# Patient Record
Sex: FEMALE | Race: WHITE | NOT HISPANIC OR LATINO
[De-identification: names, ages, dates, MRNs, and addresses within clinical notes are randomized per-mention and may not be internally consistent; named-entity substitution may affect disease eponyms.]

---

## 2017-11-08 ENCOUNTER — APPOINTMENT (OUTPATIENT)
Dept: NEUROLOGY | Facility: CLINIC | Age: 22
End: 2017-11-08
Payer: COMMERCIAL

## 2017-11-08 VITALS
BODY MASS INDEX: 23.66 KG/M2 | HEIGHT: 65 IN | OXYGEN SATURATION: 99 % | HEART RATE: 70 BPM | WEIGHT: 142 LBS | DIASTOLIC BLOOD PRESSURE: 80 MMHG | SYSTOLIC BLOOD PRESSURE: 115 MMHG

## 2017-11-08 DIAGNOSIS — Z78.9 OTHER SPECIFIED HEALTH STATUS: ICD-10-CM

## 2017-11-08 DIAGNOSIS — Z80.8 FAMILY HISTORY OF MALIGNANT NEOPLASM OF OTHER ORGANS OR SYSTEMS: ICD-10-CM

## 2017-11-08 DIAGNOSIS — Z87.09 PERSONAL HISTORY OF OTHER DISEASES OF THE RESPIRATORY SYSTEM: ICD-10-CM

## 2017-11-08 DIAGNOSIS — Z86.79 PERSONAL HISTORY OF OTHER DISEASES OF THE CIRCULATORY SYSTEM: ICD-10-CM

## 2017-11-08 DIAGNOSIS — Z82.0 FAMILY HISTORY OF EPILEPSY AND OTHER DISEASES OF THE NERVOUS SYSTEM: ICD-10-CM

## 2017-11-08 DIAGNOSIS — Z87.19 PERSONAL HISTORY OF OTHER DISEASES OF THE DIGESTIVE SYSTEM: ICD-10-CM

## 2017-11-08 DIAGNOSIS — Z83.3 FAMILY HISTORY OF DIABETES MELLITUS: ICD-10-CM

## 2017-11-08 DIAGNOSIS — Z82.69 FAMILY HISTORY OF OTHER DISEASES OF THE MUSCULOSKELETAL SYSTEM AND CONNECTIVE TISSUE: ICD-10-CM

## 2017-11-08 DIAGNOSIS — Z83.518 FAMILY HISTORY OF OTHER SPECIFIED EYE DISORDER: ICD-10-CM

## 2017-11-08 PROCEDURE — 99204 OFFICE O/P NEW MOD 45 MIN: CPT

## 2017-11-11 RX ORDER — ERGOCALCIFEROL (VITAMIN D2) 1250 MCG
50000 CAPSULE ORAL
Refills: 0 | Status: ACTIVE | COMMUNITY

## 2017-11-11 RX ORDER — RIBOFLAVIN (VITAMIN B2) 50 MG
TABLET ORAL
Refills: 0 | Status: ACTIVE | COMMUNITY

## 2017-11-11 RX ORDER — GLUCOSAMINE/CHONDR SU A SOD 750-600 MG
TABLET ORAL
Refills: 0 | Status: ACTIVE | COMMUNITY

## 2017-11-11 RX ORDER — ASCORBIC ACID 500 MG
500 TABLET ORAL DAILY
Refills: 0 | Status: ACTIVE | COMMUNITY

## 2017-11-13 ENCOUNTER — APPOINTMENT (OUTPATIENT)
Dept: NEUROLOGY | Facility: CLINIC | Age: 22
End: 2017-11-13
Payer: COMMERCIAL

## 2017-11-13 VITALS
WEIGHT: 142 LBS | OXYGEN SATURATION: 100 % | BODY MASS INDEX: 23.66 KG/M2 | HEIGHT: 65 IN | SYSTOLIC BLOOD PRESSURE: 101 MMHG | DIASTOLIC BLOOD PRESSURE: 65 MMHG | HEART RATE: 68 BPM

## 2017-11-13 PROBLEM — Z87.19 HISTORY OF IRRITABLE BOWEL SYNDROME: Status: RESOLVED | Noted: 2017-11-08 | Resolved: 2017-11-13

## 2017-11-13 PROBLEM — Z87.09 HISTORY OF ASTHMA: Status: RESOLVED | Noted: 2017-11-08 | Resolved: 2017-11-13

## 2017-11-13 PROBLEM — Z86.79 HISTORY OF CARDIAC MURMUR: Status: RESOLVED | Noted: 2017-11-08 | Resolved: 2017-11-13

## 2017-11-13 PROCEDURE — 99215 OFFICE O/P EST HI 40 MIN: CPT

## 2017-11-13 RX ORDER — LIDOCAINE 5% 700 MG/1
5 PATCH TOPICAL
Qty: 5 | Refills: 0 | Status: ACTIVE | COMMUNITY
Start: 2017-11-13 | End: 1900-01-01

## 2017-11-13 RX ORDER — RIBOFLAVIN (VITAMIN B2) 100 MG
TABLET ORAL
Refills: 0 | Status: DISCONTINUED | COMMUNITY
End: 2017-11-13

## 2017-11-13 RX ORDER — LEVOFLOXACIN 250 MG/1
250 TABLET, FILM COATED ORAL
Qty: 3 | Refills: 0 | Status: DISCONTINUED | COMMUNITY
Start: 2017-06-17 | End: 2017-11-13

## 2017-11-22 ENCOUNTER — APPOINTMENT (OUTPATIENT)
Dept: NEUROLOGY | Facility: CLINIC | Age: 22
End: 2017-11-22
Payer: COMMERCIAL

## 2017-11-22 VITALS
HEIGHT: 65 IN | HEART RATE: 88 BPM | SYSTOLIC BLOOD PRESSURE: 119 MMHG | DIASTOLIC BLOOD PRESSURE: 80 MMHG | BODY MASS INDEX: 23.66 KG/M2 | WEIGHT: 142 LBS | OXYGEN SATURATION: 98 %

## 2017-11-22 PROCEDURE — 99212 OFFICE O/P EST SF 10 MIN: CPT

## 2017-11-22 RX ORDER — MULTIVITAMIN WITH IRON
50 TABLET ORAL DAILY
Qty: 30 | Refills: 5 | Status: ACTIVE | COMMUNITY
Start: 2017-11-22 | End: 1900-01-01

## 2017-11-27 LAB
24R-OH-CALCIDIOL SERPL-MCNC: 32.7 PG/ML
25(OH)D3 SERPL-MCNC: 48 NG/ML
ACE BLD-CCNC: 34 U/L
ANA PAT FLD IF-IMP: ABNORMAL
ANA SER IF-ACNC: ABNORMAL
FOLATE SERPL-MCNC: 18.6 NG/ML
RHEUMATOID FACT SER QL: <7 IU/ML
RPR SER-TITR: NORMAL
VIT B12 SERPL-MCNC: 387 PG/ML
VIT B2 SERPL-MCNC: 235 UG/L

## 2017-12-12 ENCOUNTER — TRANSCRIPTION ENCOUNTER (OUTPATIENT)
Age: 22
End: 2017-12-12

## 2017-12-13 ENCOUNTER — APPOINTMENT (OUTPATIENT)
Dept: NEUROLOGY | Facility: CLINIC | Age: 22
End: 2017-12-13

## 2017-12-21 ENCOUNTER — APPOINTMENT (OUTPATIENT)
Dept: RHEUMATOLOGY | Facility: CLINIC | Age: 22
End: 2017-12-21
Payer: COMMERCIAL

## 2017-12-21 ENCOUNTER — LABORATORY RESULT (OUTPATIENT)
Age: 22
End: 2017-12-21

## 2017-12-21 VITALS
TEMPERATURE: 98.5 F | HEART RATE: 64 BPM | SYSTOLIC BLOOD PRESSURE: 130 MMHG | HEIGHT: 65 IN | OXYGEN SATURATION: 98 % | BODY MASS INDEX: 23.82 KG/M2 | DIASTOLIC BLOOD PRESSURE: 84 MMHG | WEIGHT: 143 LBS

## 2017-12-21 DIAGNOSIS — D84.9 IMMUNODEFICIENCY, UNSPECIFIED: ICD-10-CM

## 2017-12-21 PROCEDURE — 99205 OFFICE O/P NEW HI 60 MIN: CPT | Mod: 25

## 2017-12-21 PROCEDURE — 36415 COLL VENOUS BLD VENIPUNCTURE: CPT

## 2017-12-27 LAB
ANA SER IF-ACNC: NEGATIVE
ANCA AB SER-IMP: NEGATIVE
B2 GLYCOPROT1 IGA SERPL IA-ACNC: <5 SAU
B2 GLYCOPROT1 IGG SER-ACNC: <5 SGU
B2 GLYCOPROT1 IGM SER-ACNC: <5 SMU
C-ANCA SER-ACNC: NEGATIVE
C1INH SERPL-MCNC: 23 MG/DL
C3 SERPL-MCNC: 98 MG/DL
C4 SERPL-MCNC: 17 MG/DL
CARDIOLIPIN IGM SER-MCNC: 10.1 MPL
CARDIOLIPIN IGM SER-MCNC: 7.6 GPL
CH50 SERPL-MCNC: 52 U/ML
CMV IGG SERPL QL: <0.2 U/ML
CMV IGG SERPL-IMP: NEGATIVE
CMV IGM SERPL QL: <8 AU/ML
CMV IGM SERPL QL: NEGATIVE
CONFIRM: 26.2 SEC
DEPRECATED CARDIOLIPIN IGA SER: <5 APL
DRVVT IMM 1:2 NP PPP: NORMAL
DRVVT SCREEN TO CONFIRM RATIO: 0.85 RATIO
DSDNA AB SER-ACNC: <12 IU/ML
EBV DNA SERPL NAA+PROBE-ACNC: NOT DETECTED IU/ML
EBVPCR LOG: NOT DETECTED LOGIU/ML
ENA RNP AB SER IA-ACNC: 0.4 AL
ENA SCL70 IGG SER IA-ACNC: <0.2 AL
ENA SM AB SER IA-ACNC: <0.2 AL
ENA SS-A AB SER IA-ACNC: <0.2 AL
ENA SS-B AB SER IA-ACNC: <0.2 AL
IGA SER QL IEP: 173 MG/DL
IGG SER QL IEP: 1050 MG/DL
IGM SER QL IEP: 118 MG/DL
P-ANCA TITR SER IF: NEGATIVE
RPR SER-TITR: NORMAL
SCREEN DRVVT: 25.8 SEC
SILICA CLOTTING TIME INTERPRETATION: NORMAL
SILICA CLOTTING TIME S/C: 1.04 RATIO
SMOOTH MUSCLE AB SER QL IF: NORMAL
THYROGLOB AB SERPL-ACNC: <20 IU/ML
THYROPEROXIDASE AB SERPL IA-ACNC: <10 IU/ML
TSH SERPL-ACNC: 2.78 UIU/ML

## 2018-01-10 ENCOUNTER — OTHER (OUTPATIENT)
Age: 23
End: 2018-01-10

## 2018-01-11 ENCOUNTER — LABORATORY RESULT (OUTPATIENT)
Age: 23
End: 2018-01-11

## 2018-01-11 ENCOUNTER — APPOINTMENT (OUTPATIENT)
Dept: NEUROLOGY | Facility: CLINIC | Age: 23
End: 2018-01-11
Payer: COMMERCIAL

## 2018-01-11 VITALS
BODY MASS INDEX: 23.82 KG/M2 | WEIGHT: 143 LBS | DIASTOLIC BLOOD PRESSURE: 82 MMHG | HEIGHT: 65 IN | HEART RATE: 66 BPM | OXYGEN SATURATION: 100 % | SYSTOLIC BLOOD PRESSURE: 119 MMHG

## 2018-01-11 DIAGNOSIS — Z00.00 ENCOUNTER FOR GENERAL ADULT MEDICAL EXAMINATION W/OUT ABNORMAL FINDINGS: ICD-10-CM

## 2018-01-11 LAB
VIT B1 SERPL-MCNC: 183.1 NMOL/L
VIT B6 SERPL-MCNC: 60 UG/L

## 2018-01-11 PROCEDURE — 99215 OFFICE O/P EST HI 40 MIN: CPT

## 2018-01-11 RX ORDER — GABAPENTIN 300 MG/1
300 CAPSULE ORAL AT BEDTIME
Qty: 60 | Refills: 5 | Status: DISCONTINUED | COMMUNITY
Start: 2017-12-04 | End: 2018-01-11

## 2018-01-11 RX ORDER — GABAPENTIN 100 MG/1
100 CAPSULE ORAL DAILY
Qty: 60 | Refills: 5 | Status: DISCONTINUED | COMMUNITY
Start: 2017-11-11 | End: 2018-01-11

## 2018-01-16 PROBLEM — Z00.00 ENCOUNTER FOR PREVENTIVE HEALTH EXAMINATION: Status: ACTIVE | Noted: 2017-11-08

## 2018-01-23 ENCOUNTER — OUTPATIENT (OUTPATIENT)
Dept: OUTPATIENT SERVICES | Facility: HOSPITAL | Age: 23
LOS: 1 days | End: 2018-01-23
Payer: MEDICAID

## 2018-01-23 PROCEDURE — 78320: CPT | Mod: 26

## 2018-01-23 PROCEDURE — A9503: CPT

## 2018-01-23 PROCEDURE — 78803 RP LOCLZJ TUM SPECT 1 AREA: CPT

## 2018-01-25 ENCOUNTER — LABORATORY RESULT (OUTPATIENT)
Age: 23
End: 2018-01-25

## 2018-01-25 ENCOUNTER — APPOINTMENT (OUTPATIENT)
Dept: RHEUMATOLOGY | Facility: CLINIC | Age: 23
End: 2018-01-25
Payer: COMMERCIAL

## 2018-01-25 VITALS
HEIGHT: 65 IN | SYSTOLIC BLOOD PRESSURE: 120 MMHG | OXYGEN SATURATION: 99 % | WEIGHT: 146 LBS | DIASTOLIC BLOOD PRESSURE: 72 MMHG | HEART RATE: 72 BPM | BODY MASS INDEX: 24.32 KG/M2

## 2018-01-25 DIAGNOSIS — A77.0 SPOTTED FEVER DUE TO RICKETTSIA RICKETTSII: ICD-10-CM

## 2018-01-25 PROCEDURE — 99214 OFFICE O/P EST MOD 30 MIN: CPT | Mod: 25

## 2018-01-25 PROCEDURE — 36415 COLL VENOUS BLD VENIPUNCTURE: CPT

## 2018-01-26 LAB — MPO AB + PR3 PNL SER: NORMAL

## 2018-01-30 LAB
IGG4 SER-MCNC: 26.1 MG/DL
MYELOPEROXIDASE AB SER QL IA: <5 UNITS
MYELOPEROXIDASE CELLS FLD QL: NEGATIVE
PROTEINASE3 AB SER IA-ACNC: <5 UNITS
PROTEINASE3 AB SER-ACNC: NEGATIVE
R RICKETTSI IGG CSF-ACNC: POSITIVE
R RICKETTSI IGM CSF-ACNC: 1.29 INDEX

## 2018-02-01 ENCOUNTER — APPOINTMENT (OUTPATIENT)
Dept: INFECTIOUS DISEASE | Facility: CLINIC | Age: 23
End: 2018-02-01
Payer: COMMERCIAL

## 2018-02-01 VITALS
DIASTOLIC BLOOD PRESSURE: 83 MMHG | RESPIRATION RATE: 14 BRPM | SYSTOLIC BLOOD PRESSURE: 126 MMHG | HEART RATE: 72 BPM | HEIGHT: 65 IN | TEMPERATURE: 98.1 F | BODY MASS INDEX: 24.32 KG/M2 | WEIGHT: 146 LBS | OXYGEN SATURATION: 100 %

## 2018-02-01 DIAGNOSIS — Z86.19 PERSONAL HISTORY OF OTHER INFECTIOUS AND PARASITIC DISEASES: ICD-10-CM

## 2018-02-01 DIAGNOSIS — Z92.29 PERSONAL HISTORY OF OTHER DRUG THERAPY: ICD-10-CM

## 2018-02-01 PROCEDURE — 99204 OFFICE O/P NEW MOD 45 MIN: CPT

## 2018-02-23 ENCOUNTER — RX RENEWAL (OUTPATIENT)
Age: 23
End: 2018-02-23

## 2018-02-26 ENCOUNTER — OUTPATIENT (OUTPATIENT)
Dept: OUTPATIENT SERVICES | Facility: HOSPITAL | Age: 23
LOS: 1 days | Discharge: HOME | End: 2018-02-26

## 2018-02-26 DIAGNOSIS — H57.13 OCULAR PAIN, BILATERAL: ICD-10-CM

## 2018-02-28 LAB
24R-OH-CALCIDIOL SERPL-MCNC: 38.7 PG/ML
25(OH)D3 SERPL-MCNC: 53.9 NG/ML
A-TUMOR NECROSIS FACT SERPL-MCNC: 1 PG/ML
A-TUMOR NECROSIS FACT SERPL-MCNC: <5 PG/ML
ACE BLD-CCNC: 36 U/L
ALBUMIN MFR SERPL ELPH: 61.3 %
ALBUMIN SERPL ELPH-MCNC: 4.5 G/DL
ALBUMIN SERPL-MCNC: 4.4 G/DL
ALBUMIN/GLOB SERPL: 1.6 RATIO
ALP BLD-CCNC: 34 U/L
ALPHA1 GLOB MFR SERPL ELPH: 3.9 %
ALPHA1 GLOB SERPL ELPH-MCNC: 0.3 G/DL
ALPHA2 GLOB MFR SERPL ELPH: 9.4 %
ALPHA2 GLOB SERPL ELPH-MCNC: 0.7 G/DL
ALT SERPL-CCNC: 31 U/L
ANAPLASMA PHAGOCYTO IGM COMMENT: NORMAL
ANAPLASMA PHAGOCYTOPHILIA IGG ANTIBODIES: NORMAL
ANAPLASMA PHAGOCYTOPHILIA IGM ANTIBODIES: NORMAL
ANCA AB SER-IMP: NEGATIVE
ANION GAP SERPL CALC-SCNC: 13 MMOL/L
ARSENIC, BLOOD: 3 UG/L
AST SERPL-CCNC: 23 U/L
B BURGDOR AB SER-IMP: NEGATIVE
B BURGDOR IGM PATRN SER IB-IMP: NEGATIVE
B BURGDOR18/20KD IGM SER QL IB: NORMAL
B BURGDOR18KD IGG SER QL IB: NORMAL
B BURGDOR23KD IGG SER QL IB: NORMAL
B BURGDOR23KD IGM SER QL IB: NORMAL
B BURGDOR28KD AB SER QL IB: NORMAL
B BURGDOR28KD IGG SER QL IB: PRESENT
B BURGDOR30KD AB SER QL IB: NORMAL
B BURGDOR30KD IGG SER QL IB: NORMAL
B BURGDOR31KD IGG SER QL IB: NORMAL
B BURGDOR31KD IGM SER QL IB: NORMAL
B BURGDOR39KD IGG SER QL IB: PRESENT
B BURGDOR39KD IGM SER QL IB: NORMAL
B BURGDOR41KD IGG SER QL IB: NORMAL
B BURGDOR41KD IGM SER QL IB: NORMAL
B BURGDOR45KD AB SER QL IB: NORMAL
B BURGDOR45KD IGG SER QL IB: NORMAL
B BURGDOR58KD AB SER QL IB: NORMAL
B BURGDOR58KD IGG SER QL IB: NORMAL
B BURGDOR66KD IGG SER QL IB: PRESENT
B BURGDOR66KD IGM SER QL IB: NORMAL
B BURGDOR93KD IGG SER QL IB: NORMAL
B BURGDOR93KD IGM SER QL IB: NORMAL
B-GLOBULIN MFR SERPL ELPH: 10.8 %
B-GLOBULIN SERPL ELPH-MCNC: 0.8 G/DL
BASOPHILS # BLD AUTO: 0.05 K/UL
BASOPHILS NFR BLD AUTO: 0.7 %
BILIRUB SERPL-MCNC: 0.5 MG/DL
BUN SERPL-MCNC: 10 MG/DL
C-ANCA SER-ACNC: NEGATIVE
C3 SERPL-MCNC: 97 MG/DL
C4 SERPL-MCNC: 17 MG/DL
CADMIUM SERPL-MCNC: NORMAL UG/L
CALCIUM SERPL-MCNC: 10.1 MG/DL
CHLORIDE SERPL-SCNC: 100 MMOL/L
CO2 SERPL-SCNC: 26 MMOL/L
CONFIRM: 30 SEC
CREAT SERPL-MCNC: 0.99 MG/DL
CRP SERPL HS-MCNC: 0.9 MG/L
DEPRECATED KAPPA LC FREE/LAMBDA SER: 1.27 RATIO
DRVVT IMM 1:2 NP PPP: NORMAL
DRVVT SCREEN TO CONFIRM RATIO: 0.89 RATIO
DSDNA AB SER-ACNC: <12 IU/ML
EBV EA AB SER IA-ACNC: 35.6 U/ML
EBV EA AB TITR SER IF: POSITIVE
EBV EA IGG SER QL IA: 209 U/ML
EBV EA IGG SER-ACNC: POSITIVE
EBV EA IGM SER IA-ACNC: NEGATIVE
EBV PATRN SPEC IB-IMP: NORMAL
EBV VCA IGG SER IA-ACNC: 320 U/ML
EBV VCA IGM SER QL IA: <10 U/ML
EHRLICIA CHAFFEENIS IGG ANTIBODIES: NORMAL
EHRLICIA CHAFFEENIS IGG COMMENT: NORMAL
EHRLICIA CHAFFEENIS IGG INTERP: NORMAL
EHRLICIA CHAFFEENIS IGM ANTIBODIES: NORMAL
EOSINOPHIL # BLD AUTO: 0.22 K/UL
EOSINOPHIL NFR BLD AUTO: 3.1 %
EPSTEIN-BARR VIRUS CAPSID ANTIGEN IGG: POSITIVE
ERYTHROCYTE [SEDIMENTATION RATE] IN BLOOD BY WESTERGREN METHOD: 5 MM/HR
FOLATE SERPL-MCNC: >20 NG/ML
GAD65 AB SER-MCNC: 0 NMOL/L
GAMMA GLOB FLD ELPH-MCNC: 1.1 G/DL
GAMMA GLOB MFR SERPL ELPH: 14.6 %
GLUCOSE SERPL-MCNC: 86 MG/DL
GQ1B AB IGG: NORMAL TITER
HCT VFR BLD CALC: 39 %
HGB BLD-MCNC: 12.4 G/DL
HOMOCYSTEINE LEVEL: 7.1 UMOL/L
HSV 1+2 IGG SER IA-IMP: NEGATIVE
HSV 1+2 IGG SER IA-IMP: NEGATIVE
HSV1 IGG SER QL: 0.16 INDEX
HSV1 IGM SER QL: NORMAL TITER
HSV2 AB FLD-ACNC: NORMAL TITER
HSV2 IGG SER QL: 0.1 INDEX
IGA 24H UR QL IFE: NORMAL
IGA SER QL IEP: 174 MG/DL
IGG SER QL IEP: 991 MG/DL
IGM SER QL IEP: 109 MG/DL
IL10 SERPL-MCNC: <5 PG/ML
IL12 SERPL-MCNC: <5 PG/ML
IL13 SERPL-MCNC: 5 PG/ML
IL2 SERPL-MCNC: 5 PG/ML
IL2 SERPL-MCNC: 568 PG/ML
IL4 SERPL-MCNC: <5 PG/ML
IL6 SERPL-MCNC: <5 PG/ML
IL8 SERPL-MCNC: <5 PG/ML
IMM GRANULOCYTES NFR BLD AUTO: 0.1 %
INTERFERON GAMMA: <5 PG/ML
INTERLEUKIN 1 BETA: <5 PG/ML
INTERLEUKIN 17: <5 PG/ML
INTERLEUKIN 5: <5 PG/ML
INTERPRETATION SERPL IEP-IMP: NORMAL
KAPPA LC CSF-MCNC: 1.07 MG/DL
KAPPA LC SERPL-MCNC: 1.36 MG/DL
LEAD BLD-MCNC: NORMAL UG/DL
LYMPHOCYTES # BLD AUTO: 2.99 K/UL
LYMPHOCYTES NFR BLD AUTO: 41.7 %
M PROTEIN SPEC IFE-MCNC: NORMAL
MAN DIFF?: NORMAL
MCHC RBC-ENTMCNC: 29.5 PG
MCHC RBC-ENTMCNC: 31.8 GM/DL
MCV RBC AUTO: 92.6 FL
MERCURY BLD-MCNC: NORMAL UG/L
METHYLMALONIC ACID LEVEL: 160 NMOL/L
MONOCYTES # BLD AUTO: 0.47 K/UL
MONOCYTES NFR BLD AUTO: 6.6 %
NEUTROPHILS # BLD AUTO: 3.43 K/UL
NEUTROPHILS NFR BLD AUTO: 47.8 %
P-ANCA TITR SER IF: NEGATIVE
PLATELET # BLD AUTO: 303 K/UL
POTASSIUM SERPL-SCNC: 4.4 MMOL/L
PROT SERPL-MCNC: 7.2 G/DL
R RICKETTSI IGG CSF-ACNC: NEGATIVE
R RICKETTSI IGM CSF-ACNC: 1.7 INDEX
RBC # BLD: 4.21 M/UL
RBC # FLD: 13.9 %
SCREEN DRVVT: 29.9 SEC
SODIUM SERPL-SCNC: 139 MMOL/L
VIT B12 SERPL-MCNC: 664 PG/ML
VIT B6 SERPL-MCNC: 89.7 UG/L
WBC # FLD AUTO: 7.17 K/UL

## 2018-03-15 DIAGNOSIS — E22.9 HYPERFUNCTION OF PITUITARY GLAND, UNSPECIFIED: ICD-10-CM

## 2018-03-28 ENCOUNTER — LABORATORY RESULT (OUTPATIENT)
Age: 23
End: 2018-03-28

## 2018-03-28 ENCOUNTER — APPOINTMENT (OUTPATIENT)
Dept: NEUROLOGY | Facility: CLINIC | Age: 23
End: 2018-03-28
Payer: COMMERCIAL

## 2018-03-28 VITALS
BODY MASS INDEX: 24.32 KG/M2 | HEART RATE: 63 BPM | OXYGEN SATURATION: 99 % | DIASTOLIC BLOOD PRESSURE: 80 MMHG | WEIGHT: 146 LBS | HEIGHT: 65 IN | SYSTOLIC BLOOD PRESSURE: 100 MMHG

## 2018-03-28 PROCEDURE — 99215 OFFICE O/P EST HI 40 MIN: CPT

## 2018-04-11 ENCOUNTER — APPOINTMENT (OUTPATIENT)
Dept: NEUROLOGY | Facility: CLINIC | Age: 23
End: 2018-04-11
Payer: COMMERCIAL

## 2018-04-11 VITALS
BODY MASS INDEX: 24.32 KG/M2 | SYSTOLIC BLOOD PRESSURE: 115 MMHG | WEIGHT: 146 LBS | OXYGEN SATURATION: 99 % | DIASTOLIC BLOOD PRESSURE: 70 MMHG | HEART RATE: 66 BPM | HEIGHT: 65 IN

## 2018-04-11 PROCEDURE — 99214 OFFICE O/P EST MOD 30 MIN: CPT

## 2018-04-11 RX ORDER — NORTRIPTYLINE HYDROCHLORIDE 10 MG/1
10 CAPSULE ORAL
Qty: 60 | Refills: 3 | Status: DISCONTINUED | COMMUNITY
Start: 2018-01-11 | End: 2018-04-11

## 2018-04-11 RX ORDER — DULOXETINE HYDROCHLORIDE 30 MG/1
30 CAPSULE, DELAYED RELEASE PELLETS ORAL TWICE DAILY
Qty: 60 | Refills: 5 | Status: DISCONTINUED | COMMUNITY
Start: 2018-01-10 | End: 2018-04-11

## 2018-04-11 RX ORDER — DULOXETINE HYDROCHLORIDE 60 MG/1
60 CAPSULE, DELAYED RELEASE PELLETS ORAL
Qty: 30 | Refills: 5 | Status: DISCONTINUED | COMMUNITY
End: 2018-04-11

## 2018-04-11 RX ORDER — DIAZEPAM 10 MG/1
10 TABLET ORAL
Qty: 60 | Refills: 0 | Status: DISCONTINUED | COMMUNITY
Start: 2018-02-23 | End: 2018-04-11

## 2018-05-23 ENCOUNTER — APPOINTMENT (OUTPATIENT)
Dept: NEUROLOGY | Facility: CLINIC | Age: 23
End: 2018-05-23
Payer: COMMERCIAL

## 2018-05-23 VITALS
SYSTOLIC BLOOD PRESSURE: 110 MMHG | OXYGEN SATURATION: 99 % | HEIGHT: 65 IN | DIASTOLIC BLOOD PRESSURE: 80 MMHG | HEART RATE: 54 BPM

## 2018-05-23 PROCEDURE — 99213 OFFICE O/P EST LOW 20 MIN: CPT

## 2018-05-23 RX ORDER — OMEGA-3/DHA/EPA/FISH OIL 1200 MG
1200 CAPSULE ORAL
Refills: 0 | Status: ACTIVE | COMMUNITY

## 2018-05-23 RX ORDER — GABAPENTIN 300 MG/1
300 CAPSULE ORAL TWICE DAILY
Qty: 180 | Refills: 5 | Status: DISCONTINUED | COMMUNITY
Start: 2018-02-12 | End: 2018-05-23

## 2018-06-13 ENCOUNTER — APPOINTMENT (OUTPATIENT)
Dept: NEUROLOGY | Facility: CLINIC | Age: 23
End: 2018-06-13
Payer: COMMERCIAL

## 2018-06-13 ENCOUNTER — OUTPATIENT (OUTPATIENT)
Dept: OUTPATIENT SERVICES | Facility: HOSPITAL | Age: 23
LOS: 1 days | Discharge: HOME | End: 2018-06-13

## 2018-06-13 VITALS
WEIGHT: 145 LBS | BODY MASS INDEX: 24.16 KG/M2 | DIASTOLIC BLOOD PRESSURE: 70 MMHG | SYSTOLIC BLOOD PRESSURE: 100 MMHG | OXYGEN SATURATION: 99 % | HEART RATE: 60 BPM | HEIGHT: 65 IN

## 2018-06-13 DIAGNOSIS — G44.52 NEW DAILY PERSISTENT HEADACHE (NDPH): ICD-10-CM

## 2018-06-13 PROCEDURE — 99213 OFFICE O/P EST LOW 20 MIN: CPT

## 2018-06-13 RX ORDER — GABAPENTIN 100 MG/1
100 CAPSULE ORAL
Qty: 10 | Refills: 0 | Status: DISCONTINUED | COMMUNITY
Start: 2018-05-23 | End: 2018-06-13

## 2018-06-26 ENCOUNTER — OUTPATIENT (OUTPATIENT)
Dept: OUTPATIENT SERVICES | Facility: HOSPITAL | Age: 23
LOS: 1 days | Discharge: HOME | End: 2018-06-26

## 2018-06-26 DIAGNOSIS — H57.13 OCULAR PAIN, BILATERAL: ICD-10-CM

## 2018-06-26 DIAGNOSIS — M54.81 OCCIPITAL NEURALGIA: ICD-10-CM

## 2018-07-17 ENCOUNTER — OUTPATIENT (OUTPATIENT)
Dept: OUTPATIENT SERVICES | Facility: HOSPITAL | Age: 23
LOS: 1 days | Discharge: HOME | End: 2018-07-17

## 2018-07-17 DIAGNOSIS — M54.81 OCCIPITAL NEURALGIA: ICD-10-CM

## 2018-07-23 PROBLEM — Z80.8 FAMILY HISTORY OF SKIN CANCER: Status: ACTIVE | Noted: 2017-11-08

## 2018-07-25 ENCOUNTER — OUTPATIENT (OUTPATIENT)
Dept: OUTPATIENT SERVICES | Facility: HOSPITAL | Age: 23
LOS: 1 days | Discharge: HOME | End: 2018-07-25

## 2018-07-26 DIAGNOSIS — H57.13 OCULAR PAIN, BILATERAL: ICD-10-CM

## 2018-08-09 ENCOUNTER — RX RENEWAL (OUTPATIENT)
Age: 23
End: 2018-08-09

## 2018-08-14 ENCOUNTER — OUTPATIENT (OUTPATIENT)
Dept: OUTPATIENT SERVICES | Facility: HOSPITAL | Age: 23
LOS: 1 days | Discharge: HOME | End: 2018-08-14

## 2018-08-14 DIAGNOSIS — E06.3 AUTOIMMUNE THYROIDITIS: ICD-10-CM

## 2018-08-23 ENCOUNTER — APPOINTMENT (OUTPATIENT)
Dept: NEUROLOGY | Facility: CLINIC | Age: 23
End: 2018-08-23
Payer: COMMERCIAL

## 2018-08-23 VITALS
BODY MASS INDEX: 24.16 KG/M2 | SYSTOLIC BLOOD PRESSURE: 107 MMHG | DIASTOLIC BLOOD PRESSURE: 73 MMHG | WEIGHT: 145 LBS | HEART RATE: 63 BPM | OXYGEN SATURATION: 99 % | HEIGHT: 65 IN

## 2018-08-23 DIAGNOSIS — G44.039 EPISODIC PAROXYSMAL HEMICRANIA, NOT INTRACTABLE: ICD-10-CM

## 2018-08-23 PROCEDURE — 99213 OFFICE O/P EST LOW 20 MIN: CPT

## 2018-08-23 RX ORDER — NAPROXEN 500 MG/1
500 TABLET ORAL DAILY
Qty: 30 | Refills: 0 | Status: COMPLETED | COMMUNITY
Start: 2018-05-23 | End: 2018-08-23

## 2018-10-31 ENCOUNTER — APPOINTMENT (OUTPATIENT)
Dept: NEUROLOGY | Facility: CLINIC | Age: 23
End: 2018-10-31

## 2018-11-28 ENCOUNTER — APPOINTMENT (OUTPATIENT)
Dept: NEUROLOGY | Facility: CLINIC | Age: 23
End: 2018-11-28
Payer: MEDICAID

## 2018-11-28 VITALS
OXYGEN SATURATION: 99 % | HEIGHT: 65 IN | BODY MASS INDEX: 23.82 KG/M2 | WEIGHT: 143 LBS | HEART RATE: 77 BPM | SYSTOLIC BLOOD PRESSURE: 103 MMHG | DIASTOLIC BLOOD PRESSURE: 70 MMHG

## 2018-11-28 DIAGNOSIS — M54.81 OCCIPITAL NEURALGIA: ICD-10-CM

## 2018-11-28 DIAGNOSIS — G50.0 TRIGEMINAL NEURALGIA: ICD-10-CM

## 2018-11-28 PROCEDURE — 99214 OFFICE O/P EST MOD 30 MIN: CPT

## 2018-11-28 RX ORDER — AMOXICILLIN 875 MG/1
TABLET, FILM COATED ORAL
Refills: 0 | Status: COMPLETED | COMMUNITY

## 2018-12-26 ENCOUNTER — APPOINTMENT (OUTPATIENT)
Dept: NEUROLOGY | Facility: CLINIC | Age: 23
End: 2018-12-26

## 2019-03-04 ENCOUNTER — TRANSCRIPTION ENCOUNTER (OUTPATIENT)
Age: 24
End: 2019-03-04

## 2019-04-17 ENCOUNTER — APPOINTMENT (OUTPATIENT)
Dept: NEUROLOGY | Facility: CLINIC | Age: 24
End: 2019-04-17
Payer: MEDICAID

## 2019-04-17 VITALS
HEIGHT: 65 IN | DIASTOLIC BLOOD PRESSURE: 75 MMHG | HEART RATE: 66 BPM | OXYGEN SATURATION: 98 % | SYSTOLIC BLOOD PRESSURE: 110 MMHG | WEIGHT: 143 LBS | BODY MASS INDEX: 23.82 KG/M2

## 2019-04-17 DIAGNOSIS — R51 HEADACHE: ICD-10-CM

## 2019-04-17 PROCEDURE — 99214 OFFICE O/P EST MOD 30 MIN: CPT

## 2019-04-17 RX ORDER — CHOLECALCIFEROL (VITAMIN D3) 1250 MCG
1.25 MG CAPSULE ORAL
Qty: 12 | Refills: 0 | Status: ACTIVE | COMMUNITY
Start: 2017-11-22 | End: 1900-01-01

## 2019-04-19 PROBLEM — R51 FREQUENT HEADACHES: Status: ACTIVE | Noted: 2018-03-15

## 2019-04-19 NOTE — PHYSICAL EXAM
[FreeTextEntry1] : Constitutional: \par -Alert, in no acute distress and well nourished\par Psychiatric: \par -Oriented to person, place, and time\par -Insight and judgment intact\par -Normal affect \par Neurologic: \par -Memory: short term, remote and recent memory intact \par -Language: fluency intact, no dysarthria \par Cranial Nerves: \par -Visual acuity intact bilaterally\par -Visual fields full to confrontation\par -Pupils equal round and reactive to light\par -Extraocular motion intact\par -Facial sensation intact symmetrically\par -Face symmetrical\par -Hearing grossly intact bilaterally\par -Tongue and palate midline\par -Had turning and shoulder shrug symmetric \par Motor: \par -Muscle tone normal in all four extremities\par -Muscle strength normal in all four extremities\par -No pronator drift on the right. no pronator drift on the left\par Sensory exam\par -Light touch intact\par -Pain and temperature intact \par -Vibration intact \par Coordination:. \par -Normal gait\par -Balance intact. \par -Romberg's sign negative\par -No past-pointing\par -No tremor present\par - No dysmetria on finger to nose or heel to shin.\par Deep tendon reflexes: \par -1-2 throughout\par -Plantar responses normal on the right, normal on the left. \par -Ankle Clonus absent on the right, absent on the left.  \par Eyes: \par -Sclera and conjunctiva normal\par -Pupils equal in size, round, reactive to light, with normal accommodation\par -Extraocular movements intact \par -Full visual field \par Musculoskeletal: \par -Normal gait \par -Muscle strength and tone normal. \par Skin:\par -Normal skin color and pigmentation\par -Normal skin turgor \par -No skin lesions\par Respiratory:\par -No respiratory distress

## 2019-04-19 NOTE — HISTORY OF PRESENT ILLNESS
[FreeTextEntry1] : Patient presents for follow up of occipital neuralgia with onset of right eye pain in 10/2016. She underwent occipital nerve block in 11/2017 after which symptoms worsened to include pain in the back of head and behind her eye, zapping pain, face and eye-brow and scalp sensitivity.\par \par She reports chronic headaches. Had botox 1 week ago- does not appreciate any improvement in symptoms at this time.\par \par Of note, she followed with pediatric neurosurgeon Dr. Shepherd for arachnoid cyst who feels it may be contributing to her symptoms. Plan is to re-evaluate after botox. If symptoms persist will have 2nd opinion with Dr. Whalen at [facility] prior to proceeding with any surgical intervention. \par

## 2019-04-19 NOTE — DISCUSSION/SUMMARY
[FreeTextEntry1] : Agree with 2nd opinion before proceeding with any surgical intervention\par \par RTC 6 months or earlier as symptoms dictate

## 2024-05-28 ENCOUNTER — APPOINTMENT (OUTPATIENT)
Dept: RHEUMATOLOGY | Facility: CLINIC | Age: 29
End: 2024-05-28
Payer: MEDICAID

## 2024-05-28 VITALS
OXYGEN SATURATION: 100 % | BODY MASS INDEX: 24.96 KG/M2 | HEART RATE: 73 BPM | SYSTOLIC BLOOD PRESSURE: 112 MMHG | HEIGHT: 64.5 IN | TEMPERATURE: 97.9 F | WEIGHT: 148 LBS | DIASTOLIC BLOOD PRESSURE: 74 MMHG

## 2024-05-28 DIAGNOSIS — M79.645 PAIN IN LEFT FINGER(S): ICD-10-CM

## 2024-05-28 DIAGNOSIS — R76.8 OTHER SPECIFIED ABNORMAL IMMUNOLOGICAL FINDINGS IN SERUM: ICD-10-CM

## 2024-05-28 PROCEDURE — 99204 OFFICE O/P NEW MOD 45 MIN: CPT

## 2024-05-28 PROCEDURE — G2211 COMPLEX E/M VISIT ADD ON: CPT | Mod: NC

## 2024-05-31 PROBLEM — M79.645 PAIN OF LEFT THUMB: Status: ACTIVE | Noted: 2024-05-31

## 2024-05-31 NOTE — ASSESSMENT
[FreeTextEntry1] : 28-year-old woman referred by family member for rheumatology evaluation.  Patient noted to have positive ANALIA on recent testing, evaluated by outside rheumatologist, ANALIA 1: 320 with homogenous pattern as well as 1:80 with nucleolar pattern, all other serologies negative.  Of note PTT mildly prolonged, antiphospholipid antibodies negative, previous PTT a few years ago in the normal range, discussed repeating levels and if remains elevate would recommend hematology evaluation. Discussed ANALIA positivity, review of labs, with ANALIA positive in 2017, with repeat in 2018 negative.  It is unclear if repeated from that time until the present.  At this time, patient does not meet criteria for an underlying connective tissue disease such as systemic lupus.  Discussed ANALIA may remain positive, will continue to monitor.  Patient continues to follow with orthopedist for thumb symptoms, ultrasound and MRI without acute injury, feels improving with thumb brace although slowly.  Discussed neurology follow-up, may see Dr. Kidd for second opinion as well.  Patient has follow-up with rheumatologist in 3 months, patient will forward lab results completed at that time for my review.

## 2024-05-31 NOTE — PHYSICAL EXAM
[General Appearance - Alert] : alert [General Appearance - In No Acute Distress] : in no acute distress [General Appearance - Well Nourished] : well nourished [General Appearance - Well Developed] : well developed [Sclera] : the sclera and conjunctiva were normal [Chest Palpation] : palpation of the chest revealed no abnormalities [Edema] : there was no peripheral edema [Abnormal Walk] : normal gait [Oriented To Time, Place, And Person] : oriented to person, place, and time [Impaired Insight] : insight and judgment were intact [Affect] : the affect was normal [Mood] : the mood was normal [Examination Of The Oral Cavity] : the lips and gums were normal [Oropharynx] : the oropharynx was normal [] : no respiratory distress [Respiration, Rhythm And Depth] : normal respiratory rhythm and effort [Exaggerated Use Of Accessory Muscles For Inspiration] : no accessory muscle use [Nail Clubbing] : no clubbing  or cyanosis of the fingernails [FreeTextEntry1] : Left thumb pain with apposition, no tenderness noted.  Mild soft tissue enlargement on the medial border of mcp joint

## 2024-05-31 NOTE — HISTORY OF PRESENT ILLNESS
[FreeTextEntry1] : May 28, 2024 Patient is a 28 year-old woman, referred by orthopedist, who presents to establish rheumatological care. Before appointment with us, patient visited an outside rheumatologist in 03/2024 for left thumb pain and swelling, will follow up in three months to repeat labs. Of note, patient reports history of COVID infection twice, once before vaccine and once in 01/2023. History of mononucleosis infection in 2013. Patient also with hsitory of [place] Spotted Fever as well based on serologic testing, had followed up with 3 different infectious disease doctors, thought possible Strep infection at the time may have cross reacted with positive RMSF serologies At this time, patient tapering Lexapro 3.5-4 mg, taking Emgality once a month, Famotidine 40 mg, medical marijuana, inhaler, and Pepcid.  Supplements with fish oil, turmeric, and garlic.   Patient initially reports discomfort in her left thumb since September after holding her phone.  By December, discomfort was slowly worsened by certain activities such as cooking, wrapping gifts, and putting up [employer] lights, patient does not recall a specific injury.  Patient initially evaluated by PCP and then was referred to a hand specialist. Given history of CPRS in past, initially recommended to not brace the wrist, patient then had second opinion, xray without fracture and ultrasound completed as well.  Orthopedist diagnosed as a sprain/strain, currently wearing a brace with some benefit.  Will follow up in July. Initially endorsed stiffness and worse pain in the mornings before brace.  Thump pain worsens with use. Some pain in left second finger that was previously broken and dislocated in 2008.  Patient denies history of psoriasis. Prescribed Voltaren that did not alleviate symptoms, will try to compound   Patient with history of left knee CRPS, IBS, reactive airway disease, hypersensitive reflux (due to ulcer from Naproxen in 2021), Right Cervico-occipital neuralgia (follows with neurologist)  Work up previously for Celiac disease was negative Previously taken Pantoprazole, Desipramine (caused sedation) Occipital Neuralgia:  Not improved with nerve blocks, Botox, Gabapentin 1800 mg, Cymbalta, Lyrica. Improved with Naproxen, but led to ulcer.  Patient was recommended Transcranial magnetic stimulation by neurologist.   Patient noted to have positive ANALIA by rheumatologist Review of previous labs with positive ANALIA in 2017 as well with negative serologies, repeat ANALIA at that time negative. PTT noted as prolonged, apls labs negative, discussed repeat and may benefit from hematologist eval particularly prior to any surgical procedure.

## 2024-05-31 NOTE — END OF VISIT
[FreeTextEntry3] : All medical record entries made by the Scribe were at my, Dr. Celeste Bliss, direction and personally dictated by me on 05/28/2024. I have reviewed the chart and agree that the record accurately reflects my personal performance of the history, physical exam, assessment and plan. I have also personally directed, reviewed and agreed with the chart. [Time Spent: ___ minutes] : I have spent [unfilled] minutes of time on the encounter.

## 2024-05-31 NOTE — DATA REVIEWED
[FreeTextEntry1] : MRI thumb: reviewed report and scanned in chart: Very mild chondral thinning along the first MCP joint, otherwise intact.